# Patient Record
Sex: FEMALE | Race: WHITE | NOT HISPANIC OR LATINO
[De-identification: names, ages, dates, MRNs, and addresses within clinical notes are randomized per-mention and may not be internally consistent; named-entity substitution may affect disease eponyms.]

---

## 2017-06-15 ENCOUNTER — ASOB RESULT (OUTPATIENT)
Age: 27
End: 2017-06-15

## 2017-06-15 ENCOUNTER — APPOINTMENT (OUTPATIENT)
Dept: ANTEPARTUM | Facility: CLINIC | Age: 27
End: 2017-06-15

## 2017-08-03 ENCOUNTER — APPOINTMENT (OUTPATIENT)
Dept: ANTEPARTUM | Facility: CLINIC | Age: 27
End: 2017-08-03
Payer: COMMERCIAL

## 2017-08-03 ENCOUNTER — ASOB RESULT (OUTPATIENT)
Age: 27
End: 2017-08-03

## 2017-08-03 PROCEDURE — 76811 OB US DETAILED SNGL FETUS: CPT

## 2017-12-20 ENCOUNTER — INPATIENT (INPATIENT)
Facility: HOSPITAL | Age: 27
LOS: 2 days | Discharge: HOME | End: 2017-12-23
Attending: OBSTETRICS & GYNECOLOGY | Admitting: OBSTETRICS & GYNECOLOGY

## 2017-12-20 VITALS — WEIGHT: 205.03 LBS | HEIGHT: 66 IN

## 2017-12-20 DIAGNOSIS — Z3A.00 WEEKS OF GESTATION OF PREGNANCY NOT SPECIFIED: ICD-10-CM

## 2017-12-20 DIAGNOSIS — O26.90 PREGNANCY RELATED CONDITIONS, UNSPECIFIED, UNSPECIFIED TRIMESTER: ICD-10-CM

## 2017-12-20 LAB
BASOPHILS # BLD AUTO: 0.02 K/UL — SIGNIFICANT CHANGE UP (ref 0–0.2)
BASOPHILS NFR BLD AUTO: 0.2 % — SIGNIFICANT CHANGE UP (ref 0–2)
BLD GP AB SCN SERPL QL: NEGATIVE — SIGNIFICANT CHANGE UP
EOSINOPHIL # BLD AUTO: 0.05 K/UL — SIGNIFICANT CHANGE UP (ref 0–0.5)
EOSINOPHIL NFR BLD AUTO: 0.6 % — SIGNIFICANT CHANGE UP (ref 0–6)
HCT VFR BLD CALC: 35.6 % — SIGNIFICANT CHANGE UP (ref 34.5–45)
HGB BLD-MCNC: 11.7 G/DL — SIGNIFICANT CHANGE UP (ref 11.5–15.5)
IMM GRANULOCYTES # BLD AUTO: 0.04 # — SIGNIFICANT CHANGE UP
IMM GRANULOCYTES NFR BLD AUTO: 0.5 % — SIGNIFICANT CHANGE UP (ref 0–1.5)
LYMPHOCYTES # BLD AUTO: 1.81 K/UL — SIGNIFICANT CHANGE UP (ref 1–3.3)
LYMPHOCYTES # BLD AUTO: 20.6 % — SIGNIFICANT CHANGE UP (ref 13–44)
MCHC RBC-ENTMCNC: 28.2 PG — SIGNIFICANT CHANGE UP (ref 27–34)
MCHC RBC-ENTMCNC: 32.9 % — SIGNIFICANT CHANGE UP (ref 32–36)
MCV RBC AUTO: 85.8 FL — SIGNIFICANT CHANGE UP (ref 80–100)
MONOCYTES # BLD AUTO: 0.71 K/UL — SIGNIFICANT CHANGE UP (ref 0–0.9)
MONOCYTES NFR BLD AUTO: 8.1 % — SIGNIFICANT CHANGE UP (ref 2–14)
NEUTROPHILS # BLD AUTO: 6.17 K/UL — SIGNIFICANT CHANGE UP (ref 1.8–7.4)
NEUTROPHILS NFR BLD AUTO: 70 % — SIGNIFICANT CHANGE UP (ref 43–77)
NRBC # FLD: 0 — SIGNIFICANT CHANGE UP
PLATELET # BLD AUTO: 160 K/UL — SIGNIFICANT CHANGE UP (ref 150–400)
PMV BLD: 10.3 FL — SIGNIFICANT CHANGE UP (ref 7–13)
RBC # BLD: 4.15 M/UL — SIGNIFICANT CHANGE UP (ref 3.8–5.2)
RBC # FLD: 14.9 % — HIGH (ref 10.3–14.5)
RH IG SCN BLD-IMP: POSITIVE — SIGNIFICANT CHANGE UP
T PALLIDUM AB TITR SER: NEGATIVE — SIGNIFICANT CHANGE UP
WBC # BLD: 8.8 K/UL — SIGNIFICANT CHANGE UP (ref 3.8–10.5)
WBC # FLD AUTO: 8.8 K/UL — SIGNIFICANT CHANGE UP (ref 3.8–10.5)

## 2017-12-20 RX ORDER — OXYTOCIN 10 UNIT/ML
333.33 VIAL (ML) INJECTION
Qty: 20 | Refills: 0 | Status: DISCONTINUED | OUTPATIENT
Start: 2017-12-20 | End: 2017-12-20

## 2017-12-20 RX ORDER — AMPICILLIN TRIHYDRATE 250 MG
CAPSULE ORAL
Qty: 0 | Refills: 0 | Status: DISCONTINUED | OUTPATIENT
Start: 2017-12-20 | End: 2017-12-21

## 2017-12-20 RX ORDER — AMPICILLIN TRIHYDRATE 250 MG
2 CAPSULE ORAL ONCE
Qty: 0 | Refills: 0 | Status: COMPLETED | OUTPATIENT
Start: 2017-12-20 | End: 2017-12-20

## 2017-12-20 RX ORDER — SODIUM CHLORIDE 9 MG/ML
1000 INJECTION, SOLUTION INTRAVENOUS ONCE
Qty: 0 | Refills: 0 | Status: DISCONTINUED | OUTPATIENT
Start: 2017-12-20 | End: 2017-12-20

## 2017-12-20 RX ORDER — AMPICILLIN TRIHYDRATE 250 MG
1 CAPSULE ORAL EVERY 4 HOURS
Qty: 0 | Refills: 0 | Status: DISCONTINUED | OUTPATIENT
Start: 2017-12-20 | End: 2017-12-21

## 2017-12-20 RX ORDER — SODIUM CHLORIDE 9 MG/ML
1000 INJECTION, SOLUTION INTRAVENOUS
Qty: 0 | Refills: 0 | Status: DISCONTINUED | OUTPATIENT
Start: 2017-12-20 | End: 2017-12-20

## 2017-12-20 RX ADMIN — Medication 208 GRAM(S): at 13:37

## 2017-12-20 RX ADMIN — Medication 208 GRAM(S): at 22:05

## 2017-12-20 RX ADMIN — Medication 208 GRAM(S): at 17:56

## 2017-12-20 RX ADMIN — Medication 216 GRAM(S): at 09:03

## 2017-12-21 ENCOUNTER — TRANSCRIPTION ENCOUNTER (OUTPATIENT)
Age: 27
End: 2017-12-21

## 2017-12-21 RX ORDER — OXYTOCIN 10 UNIT/ML
333.33 VIAL (ML) INJECTION
Qty: 20 | Refills: 0 | Status: COMPLETED | OUTPATIENT
Start: 2017-12-21 | End: 2017-12-21

## 2017-12-21 RX ORDER — DOCUSATE SODIUM 100 MG
100 CAPSULE ORAL THREE TIMES A DAY
Qty: 0 | Refills: 0 | Status: DISCONTINUED | OUTPATIENT
Start: 2017-12-21 | End: 2017-12-23

## 2017-12-21 RX ORDER — SODIUM CHLORIDE 9 MG/ML
1000 INJECTION, SOLUTION INTRAVENOUS
Qty: 0 | Refills: 0 | Status: DISCONTINUED | OUTPATIENT
Start: 2017-12-21 | End: 2017-12-21

## 2017-12-21 RX ORDER — SIMETHICONE 80 MG/1
80 TABLET, CHEWABLE ORAL EVERY 6 HOURS
Qty: 0 | Refills: 0 | Status: DISCONTINUED | OUTPATIENT
Start: 2017-12-21 | End: 2017-12-23

## 2017-12-21 RX ORDER — IBUPROFEN 200 MG
1 TABLET ORAL
Qty: 0 | Refills: 0 | DISCHARGE
Start: 2017-12-21

## 2017-12-21 RX ORDER — MAGNESIUM HYDROXIDE 400 MG/1
30 TABLET, CHEWABLE ORAL
Qty: 0 | Refills: 0 | Status: DISCONTINUED | OUTPATIENT
Start: 2017-12-21 | End: 2017-12-23

## 2017-12-21 RX ORDER — MAGNESIUM HYDROXIDE 400 MG/1
30 TABLET, CHEWABLE ORAL DAILY
Qty: 0 | Refills: 0 | Status: DISCONTINUED | OUTPATIENT
Start: 2017-12-21 | End: 2017-12-23

## 2017-12-21 RX ORDER — OXYCODONE HYDROCHLORIDE 5 MG/1
5 TABLET ORAL
Qty: 0 | Refills: 0 | Status: DISCONTINUED | OUTPATIENT
Start: 2017-12-21 | End: 2017-12-23

## 2017-12-21 RX ORDER — HYDROCORTISONE 1 %
1 OINTMENT (GRAM) TOPICAL EVERY 4 HOURS
Qty: 0 | Refills: 0 | Status: DISCONTINUED | OUTPATIENT
Start: 2017-12-21 | End: 2017-12-22

## 2017-12-21 RX ORDER — SODIUM CHLORIDE 9 MG/ML
1000 INJECTION, SOLUTION INTRAVENOUS ONCE
Qty: 0 | Refills: 0 | Status: DISCONTINUED | OUTPATIENT
Start: 2017-12-21 | End: 2017-12-21

## 2017-12-21 RX ORDER — IBUPROFEN 200 MG
600 TABLET ORAL EVERY 6 HOURS
Qty: 0 | Refills: 0 | Status: COMPLETED | OUTPATIENT
Start: 2017-12-21 | End: 2018-11-19

## 2017-12-21 RX ORDER — KETOROLAC TROMETHAMINE 30 MG/ML
30 SYRINGE (ML) INJECTION ONCE
Qty: 0 | Refills: 0 | Status: DISCONTINUED | OUTPATIENT
Start: 2017-12-21 | End: 2017-12-21

## 2017-12-21 RX ORDER — DIBUCAINE 1 %
1 OINTMENT (GRAM) RECTAL EVERY 4 HOURS
Qty: 0 | Refills: 0 | Status: DISCONTINUED | OUTPATIENT
Start: 2017-12-21 | End: 2017-12-21

## 2017-12-21 RX ORDER — OXYTOCIN 10 UNIT/ML
41.67 VIAL (ML) INJECTION
Qty: 20 | Refills: 0 | Status: DISCONTINUED | OUTPATIENT
Start: 2017-12-21 | End: 2017-12-21

## 2017-12-21 RX ORDER — OXYCODONE HYDROCHLORIDE 5 MG/1
5 TABLET ORAL EVERY 4 HOURS
Qty: 0 | Refills: 0 | Status: DISCONTINUED | OUTPATIENT
Start: 2017-12-21 | End: 2017-12-23

## 2017-12-21 RX ORDER — AER TRAVELER 0.5 G/1
1 SOLUTION RECTAL; TOPICAL EVERY 4 HOURS
Qty: 0 | Refills: 0 | Status: DISCONTINUED | OUTPATIENT
Start: 2017-12-21 | End: 2017-12-21

## 2017-12-21 RX ORDER — PRAMOXINE HYDROCHLORIDE 150 MG/15G
1 AEROSOL, FOAM RECTAL EVERY 4 HOURS
Qty: 0 | Refills: 0 | Status: DISCONTINUED | OUTPATIENT
Start: 2017-12-21 | End: 2017-12-22

## 2017-12-21 RX ORDER — DOCUSATE SODIUM 100 MG
100 CAPSULE ORAL
Qty: 0 | Refills: 0 | Status: DISCONTINUED | OUTPATIENT
Start: 2017-12-21 | End: 2017-12-23

## 2017-12-21 RX ORDER — OXYTOCIN 10 UNIT/ML
333.33 VIAL (ML) INJECTION
Qty: 20 | Refills: 0 | Status: COMPLETED | OUTPATIENT
Start: 2017-12-21

## 2017-12-21 RX ORDER — GLYCERIN ADULT
1 SUPPOSITORY, RECTAL RECTAL AT BEDTIME
Qty: 0 | Refills: 0 | Status: DISCONTINUED | OUTPATIENT
Start: 2017-12-21 | End: 2017-12-23

## 2017-12-21 RX ORDER — OXYTOCIN 10 UNIT/ML
2 VIAL (ML) INJECTION
Qty: 30 | Refills: 0 | Status: DISCONTINUED | OUTPATIENT
Start: 2017-12-21 | End: 2017-12-21

## 2017-12-21 RX ORDER — AER TRAVELER 0.5 G/1
1 SOLUTION RECTAL; TOPICAL EVERY 4 HOURS
Qty: 0 | Refills: 0 | Status: DISCONTINUED | OUTPATIENT
Start: 2017-12-21 | End: 2017-12-23

## 2017-12-21 RX ORDER — PRAMOXINE HYDROCHLORIDE 150 MG/15G
1 AEROSOL, FOAM RECTAL EVERY 4 HOURS
Qty: 0 | Refills: 0 | Status: DISCONTINUED | OUTPATIENT
Start: 2017-12-21 | End: 2017-12-21

## 2017-12-21 RX ORDER — LANOLIN
1 OINTMENT (GRAM) TOPICAL EVERY 6 HOURS
Qty: 0 | Refills: 0 | Status: DISCONTINUED | OUTPATIENT
Start: 2017-12-21 | End: 2017-12-23

## 2017-12-21 RX ORDER — HYDROCORTISONE 1 %
1 OINTMENT (GRAM) TOPICAL EVERY 4 HOURS
Qty: 0 | Refills: 0 | Status: DISCONTINUED | OUTPATIENT
Start: 2017-12-21 | End: 2017-12-21

## 2017-12-21 RX ORDER — DIPHENHYDRAMINE HCL 50 MG
25 CAPSULE ORAL EVERY 6 HOURS
Qty: 0 | Refills: 0 | Status: DISCONTINUED | OUTPATIENT
Start: 2017-12-21 | End: 2017-12-23

## 2017-12-21 RX ORDER — IBUPROFEN 200 MG
600 TABLET ORAL EVERY 6 HOURS
Qty: 0 | Refills: 0 | Status: DISCONTINUED | OUTPATIENT
Start: 2017-12-21 | End: 2017-12-23

## 2017-12-21 RX ORDER — DIBUCAINE 1 %
1 OINTMENT (GRAM) RECTAL EVERY 4 HOURS
Qty: 0 | Refills: 0 | Status: DISCONTINUED | OUTPATIENT
Start: 2017-12-21 | End: 2017-12-23

## 2017-12-21 RX ORDER — TETANUS TOXOID, REDUCED DIPHTHERIA TOXOID AND ACELLULAR PERTUSSIS VACCINE, ADSORBED 5; 2.5; 8; 8; 2.5 [IU]/.5ML; [IU]/.5ML; UG/.5ML; UG/.5ML; UG/.5ML
0.5 SUSPENSION INTRAMUSCULAR ONCE
Qty: 0 | Refills: 0 | Status: DISCONTINUED | OUTPATIENT
Start: 2017-12-21 | End: 2017-12-23

## 2017-12-21 RX ORDER — FERROUS SULFATE 325(65) MG
325 TABLET ORAL
Qty: 0 | Refills: 0 | Status: DISCONTINUED | OUTPATIENT
Start: 2017-12-21 | End: 2017-12-23

## 2017-12-21 RX ORDER — ASCORBIC ACID 60 MG
250 TABLET,CHEWABLE ORAL
Qty: 0 | Refills: 0 | Status: DISCONTINUED | OUTPATIENT
Start: 2017-12-21 | End: 2017-12-23

## 2017-12-21 RX ORDER — SODIUM CHLORIDE 9 MG/ML
3 INJECTION INTRAMUSCULAR; INTRAVENOUS; SUBCUTANEOUS EVERY 8 HOURS
Qty: 0 | Refills: 0 | Status: DISCONTINUED | OUTPATIENT
Start: 2017-12-21 | End: 2017-12-21

## 2017-12-21 RX ORDER — SODIUM CHLORIDE 9 MG/ML
3 INJECTION INTRAMUSCULAR; INTRAVENOUS; SUBCUTANEOUS EVERY 8 HOURS
Qty: 0 | Refills: 0 | Status: DISCONTINUED | OUTPATIENT
Start: 2017-12-21 | End: 2017-12-23

## 2017-12-21 RX ADMIN — Medication 208 GRAM(S): at 02:00

## 2017-12-21 RX ADMIN — Medication 1000 MILLIUNIT(S)/MIN: at 10:36

## 2017-12-21 RX ADMIN — Medication 30 MILLIGRAM(S): at 10:37

## 2017-12-21 RX ADMIN — Medication 30 MILLIGRAM(S): at 11:11

## 2017-12-21 RX ADMIN — Medication 125 MILLIUNIT(S)/MIN: at 10:37

## 2017-12-21 RX ADMIN — Medication 600 MILLIGRAM(S): at 22:35

## 2017-12-21 RX ADMIN — Medication 2 MILLIUNIT(S)/MIN: at 02:38

## 2017-12-21 RX ADMIN — Medication 325 MILLIGRAM(S): at 17:53

## 2017-12-21 RX ADMIN — Medication 0.2 MILLIGRAM(S): at 13:24

## 2017-12-21 RX ADMIN — Medication 0.2 MILLIGRAM(S): at 22:04

## 2017-12-21 RX ADMIN — Medication 208 GRAM(S): at 06:03

## 2017-12-21 RX ADMIN — Medication 600 MILLIGRAM(S): at 22:04

## 2017-12-21 RX ADMIN — Medication 0.2 MILLIGRAM(S): at 17:21

## 2017-12-21 RX ADMIN — SODIUM CHLORIDE 3 MILLILITER(S): 9 INJECTION INTRAMUSCULAR; INTRAVENOUS; SUBCUTANEOUS at 22:35

## 2017-12-21 NOTE — DISCHARGE NOTE OB - PLAN OF CARE
postpartum recovery Call Dr Ellis if any issues with bleeding pain fever shortness of breath leg swelling

## 2017-12-21 NOTE — DISCHARGE NOTE OB - MEDICATION SUMMARY - MEDICATIONS TO TAKE
I will START or STAY ON the medications listed below when I get home from the hospital:    ibuprofen 600 mg oral tablet  -- 1 tab(s) by mouth every 6 hours  -- Indication: For Pain    Prenatal 1 Plus 1 oral tablet  -- 1 tab(s) by mouth once a day  -- Indication: For Postpartum

## 2017-12-21 NOTE — PROVIDER CONTACT NOTE (OTHER) - ASSESSMENT
On admission assessment @ 1200, pt peripads saturated x 2 pads with blue chux saturatedand flat golf ball size clot noted.. Fundus firm and at umbilicus, 2nd degree laceration well approximated. Pt. peripads changed and monitored x 1 hour. At 1300 fundus firm and at umbilicus, pt. states felt ghush of blood upon palpation and noted to have approximately 1 peripad saturated.

## 2017-12-21 NOTE — PROVIDER CONTACT NOTE (OTHER) - ACTION/TREATMENT ORDERED:
Dr. Gamino PGY1 to bedside to assess pt. Administered Cytotec per rectally and began methergine series. Continue to monitor pt and notify MD of any further changes.

## 2017-12-21 NOTE — DISCHARGE NOTE OB - CARE PLAN
Principal Discharge DX:	Delivery normal  Goal:	postpartum recovery  Instructions for follow-up, activity and diet:	Call Dr Ellis if any issues with bleeding pain fever shortness of breath leg swelling

## 2017-12-21 NOTE — DISCHARGE NOTE OB - MATERIALS PROVIDED
Birth Certificate Instructions/Vaccinations/Bottle Feeding Log/Tdap Vaccination (VIS Pub Date: 2012)/  Immunization Record/Breastfeeding Log/Guide to Postpartum Care/[facility] [place] Screening Program/[facility] Hearing Screen Program/Shaken Baby Prevention Handout

## 2017-12-21 NOTE — DISCHARGE NOTE OB - CARE PROVIDER_API CALL
Yumiko Cerrato), Obstetrics and Gynecology  [address] [place] [address]  Phone: [phone]  Fax: [phone]

## 2017-12-21 NOTE — DISCHARGE NOTE OB - HOSPITAL COURSE
26 yo  at 39.5wks admitted with PROM - Cytotec and pitocin induction    of a viable female infant   2nd degree perineal laceration   normal postpartum

## 2017-12-21 NOTE — CHART NOTE - NSCHARTNOTEFT_GEN_A_CORE
S: Pt examined at bedside for PPH of 200cc.    Pt feeling well, no lightheadedness, dizziness, SOB, CP.  Tolerating PO, ambulating, voiding well, no nausea/vomiting.    O: /59 HR 98 O2Sa 98%@RA  Abd: soft, non tender, non distended, uterus firm and at level below umbilicus  : no active vaginal bleeding, some dark red clots expressed, lochia wnl    A/P: Pt immediately postpartum s/p , w hx of retained placenta in prior pregnancy  - cytotec 1000 MS  - methergine series started  - continue to monitor    D/w Dr. Caleb Gamino, PGY1    Addendem:   Pt reevaluated at bedside, per nurse a large clot estimated at 100cc passed while urinating.    Pt still stable, clinical status unchanged.  No complaints.    O: /58  O2Sa95%@RA  Abd: soft, non tender, non distended w uterus still firm at below umbilicus  : spec exam revealed no brisk bright red bleeding, minimal oozing, cervix not visualized due to pt discomfort w exam, vaginal walls intact and sutures in place in perineum; no clots in vaginal vault; lochia is normal    Bedside ultrasound: uterine stripe visualized, no retained products or clots in uterus, bladder is full    A/P: Pt is stable to recover on post partum floors  - continue w methergine series  - continue to monitor vitals  - CBC in AM?    Will discuss w Dr. Caleb Gamino, PGY1 S: Pt examined at bedside for PPH of 200cc.    Pt feeling well, no lightheadedness, dizziness, SOB, CP.  Tolerating PO, ambulating, voiding well, no nausea/vomiting.    O: /59 HR 98 O2Sa 98%@RA  Abd: soft, non tender, non distended, uterus firm and at level below umbilicus  : no active vaginal bleeding, some dark red clots expressed, lochia wnl    A/P: Pt immediately postpartum s/p , w hx of retained placenta in prior pregnancy  - cytotec 1000 SC  - methergine series started  - continue to monitor    D/w Dr. Caleb Gamino, PGY1    Addendem:   Pt reevaluated at bedside, per nurse a large clot estimated at 100cc passed while urinating.    Pt still stable, clinical status unchanged.  No complaints.    O: /58  O2Sa95%@RA  Abd: soft, non tender, non distended w uterus still firm at below umbilicus  : spec exam revealed no brisk bright red bleeding, minimal oozing, cervix not visualized due to pt discomfort w exam, vaginal walls intact and sutures in place in perineum; no clots in vaginal vault; lochia is normal    Bedside ultrasound: uterine stripe visualized, no retained products or clots in uterus, bladder is full    A/P: Pt is stable to recover on post partum floors  - continue w methergine series  - continue to monitor vitals  - CBC in AM    D/w Dr. Caleb Gamino, PGY1

## 2017-12-21 NOTE — DISCHARGE NOTE OB - PATIENT PORTAL LINK FT
“You can access the [facility] Patient Portal, offered by [facility], by registering with the following website: [email or web address]”

## 2017-12-22 LAB
HCT VFR BLD CALC: 29.2 % — LOW (ref 34.5–45)
HGB BLD-MCNC: 9.4 G/DL — LOW (ref 11.5–15.5)
MCHC RBC-ENTMCNC: 27.9 PG — SIGNIFICANT CHANGE UP (ref 27–34)
MCHC RBC-ENTMCNC: 32.2 % — SIGNIFICANT CHANGE UP (ref 32–36)
MCV RBC AUTO: 86.6 FL — SIGNIFICANT CHANGE UP (ref 80–100)
NRBC # FLD: 0 — SIGNIFICANT CHANGE UP
PLATELET # BLD AUTO: 140 K/UL — LOW (ref 150–400)
PMV BLD: 10.3 FL — SIGNIFICANT CHANGE UP (ref 7–13)
RBC # BLD: 3.37 M/UL — LOW (ref 3.8–5.2)
RBC # FLD: 15.4 % — HIGH (ref 10.3–14.5)
WBC # BLD: 10.38 K/UL — SIGNIFICANT CHANGE UP (ref 3.8–10.5)
WBC # FLD AUTO: 10.38 K/UL — SIGNIFICANT CHANGE UP (ref 3.8–10.5)

## 2017-12-22 RX ORDER — PRAMOXINE HYDROCHLORIDE 150 MG/15G
1 AEROSOL, FOAM RECTAL EVERY 4 HOURS
Qty: 0 | Refills: 0 | Status: DISCONTINUED | OUTPATIENT
Start: 2017-12-22 | End: 2017-12-23

## 2017-12-22 RX ORDER — HYDROCORTISONE 1 %
1 OINTMENT (GRAM) TOPICAL EVERY 4 HOURS
Qty: 0 | Refills: 0 | Status: DISCONTINUED | OUTPATIENT
Start: 2017-12-22 | End: 2017-12-23

## 2017-12-22 RX ADMIN — Medication 600 MILLIGRAM(S): at 23:19

## 2017-12-22 RX ADMIN — Medication 600 MILLIGRAM(S): at 23:46

## 2017-12-22 RX ADMIN — Medication 600 MILLIGRAM(S): at 11:27

## 2017-12-22 RX ADMIN — Medication 0.2 MILLIGRAM(S): at 11:29

## 2017-12-22 RX ADMIN — Medication 600 MILLIGRAM(S): at 12:10

## 2017-12-22 RX ADMIN — Medication 600 MILLIGRAM(S): at 06:08

## 2017-12-22 RX ADMIN — Medication 0.2 MILLIGRAM(S): at 05:38

## 2017-12-22 RX ADMIN — Medication 250 MILLIGRAM(S): at 07:00

## 2017-12-22 RX ADMIN — SODIUM CHLORIDE 3 MILLILITER(S): 9 INJECTION INTRAMUSCULAR; INTRAVENOUS; SUBCUTANEOUS at 06:30

## 2017-12-22 RX ADMIN — Medication 100 MILLIGRAM(S): at 05:38

## 2017-12-22 RX ADMIN — Medication 100 MILLIGRAM(S): at 23:18

## 2017-12-22 RX ADMIN — Medication 600 MILLIGRAM(S): at 05:38

## 2017-12-22 RX ADMIN — Medication 0.2 MILLIGRAM(S): at 02:00

## 2017-12-22 NOTE — LACTATION INITIAL EVALUATION - LACTATION INTERVENTIONS
initiate hand expression routine/initiate dual electric pump routine/mother taught hand expression and not much colostrum noted/initiate skin to skin
initiate skin to skin/mother educated to breast pump after each feeding for stimulation/initiate dual electric pump routine/initiate hand expression routine

## 2017-12-22 NOTE — LACTATION INITIAL EVALUATION - POTENTIAL FOR
ineffective breastfeeding/knowledge deficit/sore nipples
knowledge deficit/ineffective breastfeeding

## 2017-12-22 NOTE — PROGRESS NOTE ADULT - PROBLEM SELECTOR PLAN 1
- encourage more out of bed and ambulation  - analgesia PRN  - regular diet  - am CBC drawn, f/u results    KELLY Gamino PGY1

## 2017-12-22 NOTE — LACTATION INITIAL EVALUATION - LATCH: LATCH INFANT
repeated attempts, holds nipple in mouth, stimulate to suck
repeated attempts, holds nipple in mouth, stimulate to suck

## 2017-12-22 NOTE — PROGRESS NOTE ADULT - ATTENDING COMMENTS
Patient seen and agree with above  VSS  minimal lochia  ext - c/c/e nt    IMP  PPD #1  post   PPH, stable  PLAN  continue pp care  discharge   ARIEL Burgos

## 2017-12-22 NOTE — PROGRESS NOTE ADULT - SUBJECTIVE AND OBJECTIVE BOX
Pt seen at bedside for post-epidural check. No parasthesias, numbness, or weakness of LE's. Bowel/bladder function intact. No complaints at this time

## 2017-12-22 NOTE — PROGRESS NOTE ADULT - SUBJECTIVE AND OBJECTIVE BOX
27y old w PMHX of retained placenta, post partum day 1, +300cc    Patient seen and examined at bedside, no acute overnight events. No acute complaints, pain well controlled.  Patient is ambulating, voiding spontaneously, passing gas, and tolerating regular diet.     Denies dizziess, lightheadedness, SOB.  States vaginal bleeding has stopped, and she has stopped passing clots since getting up to the floors.    Vital Signs Last 24 Hours  T(C): 36.6 (12-22-17 @ 06:02), Max: 37.4 (12-21-17 @ 09:33)  HR: 79 (12-22-17 @ 06:02) (78 - 102)  BP: 98/63 (12-22-17 @ 06:02) (98/63 - 112/68)  RR: 18 (12-22-17 @ 06:02) (16 - 18)  SpO2: 99% (12-22-17 @ 06:02) (95% - 100%)    Physical Exam:  General: NAD  Abdomen: Soft, non-tender, non-distended, fundus firm  Pelvic: Lochia wnl, minimal  Ext: NTBL  Labs:  Blood type: A Positive  Rubella IgG: RPR: Negative                          11.7   8.80 >-----------< 160    ( 12-20 @ 09:01 )             35.6                  MEDICATIONS  (STANDING):  ascorbic acid 250 milliGRAM(s) Oral two times a day  diphtheria/tetanus/pertussis (acellular) Vaccine (ADAcel) 0.5 milliLiter(s) IntraMuscular once  docusate sodium 100 milliGRAM(s) Oral three times a day  ferrous    sulfate 325 milliGRAM(s) Oral three times a day with meals  ibuprofen  Tablet 600 milliGRAM(s) Oral every 6 hours  methylergonovine 0.2 milliGRAM(s) Oral every 4 hours  oxyCODONE    IR 5 milliGRAM(s) Oral every 3 hours  prenatal multivitamin 1 Tablet(s) Oral daily  sodium chloride 0.9% lock flush 3 milliLiter(s) IV Push every 8 hours    MEDICATIONS  (PRN):  dibucaine 1% Ointment 1 Application(s) Topical every 4 hours PRN Perineal Discomfort  diphenhydrAMINE   Capsule 25 milliGRAM(s) Oral every 6 hours PRN Itching  docusate sodium 100 milliGRAM(s) Oral two times a day PRN Stool Softening  glycerin Suppository - Adult 1 Suppository(s) Rectal at bedtime PRN Constipation  hydrocortisone 1% Cream 1 Application(s) Topical every 4 hours PRN perineal discomfort  lanolin Ointment 1 Application(s) Topical every 6 hours PRN Sore Nipples  magnesium hydroxide Suspension 30 milliLiter(s) Oral two times a day PRN Constipation  magnesium hydroxide Suspension 30 milliLiter(s) Oral daily PRN Constipation  oxyCODONE    IR 5 milliGRAM(s) Oral every 4 hours PRN Severe Pain (7 -10)  pramoxine 1%/zinc 5% Cream 1 Application(s) Topical every 4 hours PRN perineal discomfort  simethicone 80 milliGRAM(s) Chew every 6 hours PRN Gas  witch hazel Pads 1 Application(s) Topical every 4 hours PRN Perineal Discomfort

## 2017-12-22 NOTE — LACTATION INITIAL EVALUATION - INTERVENTION OUTCOME
verbalizes understanding/good return demonstration/Worked with mother to latch  and  latches for 5 minutes and is disorganized and mother to triple feed, discussed to breastfeed 8-12 times in 24 hours and then breast pump every 2-3 hours and give colostrum and taught alternative methods and then formula and discussed breastfeeding log and referral list to lactation  consultants provided , Rn aware of plan/demonstrates understanding of teaching
demonstrates understanding of teaching/Worked with mother in positioning and latch and  trying to latch and not 24 hours , mother with widely spaced breasts and denies history of PCOS , mother shown hand expression and colostrum noted and mother educated on feeding cues and to breastfeed 8-12 times in 24 hours and discussed breastfeeding log and assist prn, Rn aware of plan/good return demonstration/verbalizes understanding

## 2017-12-22 NOTE — LACTATION INITIAL EVALUATION - NS LACT CON REASON FOR REQ
mother with widely spaced breasts and noted to have low supply first child/multiparous mom
mother with history of low supply/multiparous mom

## 2017-12-23 VITALS
SYSTOLIC BLOOD PRESSURE: 105 MMHG | OXYGEN SATURATION: 100 % | HEART RATE: 80 BPM | RESPIRATION RATE: 20 BRPM | DIASTOLIC BLOOD PRESSURE: 57 MMHG

## 2017-12-23 RX ADMIN — Medication 325 MILLIGRAM(S): at 09:28

## 2017-12-23 RX ADMIN — Medication 1 TABLET(S): at 09:28

## 2017-12-23 RX ADMIN — Medication 600 MILLIGRAM(S): at 05:49

## 2017-12-23 RX ADMIN — Medication 600 MILLIGRAM(S): at 06:17

## 2017-12-23 RX ADMIN — Medication 100 MILLIGRAM(S): at 05:50

## 2017-12-23 NOTE — PROGRESS NOTE ADULT - PROBLEM SELECTOR PLAN 1
- continue with po analgesia  - increase ambulation  - SCDs when in bed  - encourage incentive spirometry  - continue regular diet  - IV lock  - no labs    Marie Cisneros MD PGY1

## 2017-12-23 NOTE — PROGRESS NOTE ADULT - SUBJECTIVE AND OBJECTIVE BOX
Patient seen and examined at bedside, no acute overnight events. No acute complaints, pain well controlled. Patient is ambulating, voiding spontaneously, passing gas, and tolerating regular diet.    Vital Signs Last 24 Hours  T(C): 36.6 (12-22-17 @ 17:56), Max: 36.7 (12-22-17 @ 09:58)  HR: 70 (12-22-17 @ 17:56) (70 - 79)  BP: 100/63 (12-22-17 @ 17:56) (98/63 - 103/59)  RR: 17 (12-22-17 @ 17:56) (17 - 18)  SpO2: 98% (12-22-17 @ 17:56) (98% - 99%)    Physical Exam:  General: NAD  Abdomen: soft, non-tender, non-distended, fundus firm  Pelvic: lochia wnl    Labs:  Blood Type: A Positive  Antibody Screen: Negative  RPR: Negative               9.4    10.38 )-----------( 140      ( 12-22 @ 06:30 )             29.2                11.7   8.80  )-----------( 160      ( 12-20 @ 09:01 )             35.6         MEDICATIONS  (STANDING):  ascorbic acid 250 milliGRAM(s) Oral two times a day  diphtheria/tetanus/pertussis (acellular) Vaccine (ADAcel) 0.5 milliLiter(s) IntraMuscular once  docusate sodium 100 milliGRAM(s) Oral three times a day  ferrous    sulfate 325 milliGRAM(s) Oral three times a day with meals  ibuprofen  Tablet 600 milliGRAM(s) Oral every 6 hours  oxyCODONE    IR 5 milliGRAM(s) Oral every 3 hours  prenatal multivitamin 1 Tablet(s) Oral daily  sodium chloride 0.9% lock flush 3 milliLiter(s) IV Push every 8 hours    MEDICATIONS  (PRN):  dibucaine 1% Ointment 1 Application(s) Topical every 4 hours PRN Perineal Discomfort  diphenhydrAMINE   Capsule 25 milliGRAM(s) Oral every 6 hours PRN Itching  docusate sodium 100 milliGRAM(s) Oral two times a day PRN Stool Softening  glycerin Suppository - Adult 1 Suppository(s) Rectal at bedtime PRN Constipation  hydrocortisone 1% Cream 1 Application(s) Topical every 4 hours PRN perineal discomfort  lanolin Ointment 1 Application(s) Topical every 6 hours PRN Sore Nipples  magnesium hydroxide Suspension 30 milliLiter(s) Oral two times a day PRN Constipation  magnesium hydroxide Suspension 30 milliLiter(s) Oral daily PRN Constipation  oxyCODONE    IR 5 milliGRAM(s) Oral every 4 hours PRN Severe Pain (7 -10)  pramoxine 1%/zinc 5% Cream 1 Application(s) Topical every 4 hours PRN perineal discomfort  simethicone 80 milliGRAM(s) Chew every 6 hours PRN Gas  witch hazel Pads 1 Application(s) Topical every 4 hours PRN Perineal Discomfort

## 2019-03-25 ENCOUNTER — RESULT REVIEW (OUTPATIENT)
Age: 29
End: 2019-03-25

## 2019-04-16 ENCOUNTER — ASOB RESULT (OUTPATIENT)
Age: 29
End: 2019-04-16

## 2019-04-16 ENCOUNTER — APPOINTMENT (OUTPATIENT)
Dept: ANTEPARTUM | Facility: CLINIC | Age: 29
End: 2019-04-16
Payer: COMMERCIAL

## 2019-04-16 PROCEDURE — 76801 OB US < 14 WKS SINGLE FETUS: CPT | Mod: 59

## 2019-04-16 PROCEDURE — 36416 COLLJ CAPILLARY BLOOD SPEC: CPT

## 2019-04-16 PROCEDURE — 76813 OB US NUCHAL MEAS 1 GEST: CPT

## 2019-06-18 ENCOUNTER — OUTPATIENT (OUTPATIENT)
Dept: OUTPATIENT SERVICES | Facility: HOSPITAL | Age: 29
LOS: 1 days | Discharge: HOME | End: 2019-06-18

## 2019-06-18 DIAGNOSIS — D64.9 ANEMIA, UNSPECIFIED: ICD-10-CM

## 2019-06-19 ENCOUNTER — ASOB RESULT (OUTPATIENT)
Age: 29
End: 2019-06-19

## 2019-06-19 ENCOUNTER — APPOINTMENT (OUTPATIENT)
Dept: ANTEPARTUM | Facility: CLINIC | Age: 29
End: 2019-06-19
Payer: COMMERCIAL

## 2019-06-19 PROCEDURE — 76811 OB US DETAILED SNGL FETUS: CPT

## 2019-08-02 ENCOUNTER — OUTPATIENT (OUTPATIENT)
Dept: OUTPATIENT SERVICES | Facility: HOSPITAL | Age: 29
LOS: 1 days | Discharge: HOME | End: 2019-08-02

## 2019-08-02 DIAGNOSIS — Z36.89 ENCOUNTER FOR OTHER SPECIFIED ANTENATAL SCREENING: ICD-10-CM

## 2019-09-06 ENCOUNTER — OUTPATIENT (OUTPATIENT)
Dept: OUTPATIENT SERVICES | Facility: HOSPITAL | Age: 29
LOS: 1 days | Discharge: HOME | End: 2019-09-06

## 2019-09-06 DIAGNOSIS — R42 DIZZINESS AND GIDDINESS: ICD-10-CM

## 2019-09-09 ENCOUNTER — OUTPATIENT (OUTPATIENT)
Dept: OUTPATIENT SERVICES | Facility: HOSPITAL | Age: 29
LOS: 1 days | Discharge: HOME | End: 2019-09-09
Payer: COMMERCIAL

## 2019-09-09 VITALS
TEMPERATURE: 98 F | RESPIRATION RATE: 20 BRPM | HEART RATE: 79 BPM | SYSTOLIC BLOOD PRESSURE: 107 MMHG | DIASTOLIC BLOOD PRESSURE: 57 MMHG

## 2019-09-09 VITALS — SYSTOLIC BLOOD PRESSURE: 99 MMHG | HEART RATE: 83 BPM | DIASTOLIC BLOOD PRESSURE: 55 MMHG

## 2019-09-09 DIAGNOSIS — O26.899 OTHER SPECIFIED PREGNANCY RELATED CONDITIONS, UNSPECIFIED TRIMESTER: ICD-10-CM

## 2019-09-09 DIAGNOSIS — Z3A.00 WEEKS OF GESTATION OF PREGNANCY NOT SPECIFIED: ICD-10-CM

## 2019-09-09 PROCEDURE — 59025 FETAL NON-STRESS TEST: CPT | Mod: 26

## 2019-09-09 NOTE — OB PROVIDER TRIAGE NOTE - NS_OBGYNHISTORY_OBGYN_ALL_OB_FT
8/24/2016 FTSVD 7lb 6oz retained placenta, no D&C  12/17/17 FTSVD 7lb 10z    Denies current AP complications

## 2019-09-09 NOTE — OB PROVIDER TRIAGE NOTE - NSOBPROVIDERNOTE_OBGYN_ALL_OB_FT
29 y.o.  @ 32.5 weeks c/o decreased fetal movement since 7am. Denies vb, lof, contractions.     Denies significant med/surg/gyn history  Ob -  2016 FTSVD 7lb 6oz retained placenta, no D&C  17 FTSVD 7lb 10z    Denies current AP complications  NKDA  meds - PNV    abdomen soft and nontender  VSS  TAS - breech, anterior placenta, brandy 12cm, bpp     d/w Dr. Dubon  pt discharged - reassuring fetal status  f/u with next scheduled appointment  daily kick counts reviewed  ptl precautions reviewed

## 2019-09-15 ENCOUNTER — OUTPATIENT (OUTPATIENT)
Dept: INPATIENT UNIT | Facility: HOSPITAL | Age: 29
LOS: 1 days | Discharge: HOME | End: 2019-09-15
Payer: COMMERCIAL

## 2019-09-15 VITALS — SYSTOLIC BLOOD PRESSURE: 106 MMHG | DIASTOLIC BLOOD PRESSURE: 61 MMHG | HEART RATE: 89 BPM

## 2019-09-15 DIAGNOSIS — Z3A.00 WEEKS OF GESTATION OF PREGNANCY NOT SPECIFIED: ICD-10-CM

## 2019-09-15 DIAGNOSIS — O26.899 OTHER SPECIFIED PREGNANCY RELATED CONDITIONS, UNSPECIFIED TRIMESTER: ICD-10-CM

## 2019-09-15 LAB — AMNISURE ROM (RUPTURE OF MEMBRANES): NEGATIVE — SIGNIFICANT CHANGE UP

## 2019-09-15 PROCEDURE — 59025 FETAL NON-STRESS TEST: CPT | Mod: 26

## 2019-09-15 NOTE — OB PROVIDER TRIAGE NOTE - NS_OBGYNHISTORY_OBGYN_ALL_OB_FT
GYN: Denies  OB:  16, FT, retained placenta, 7#10         2017, FT, uncomplicated 7#10      AP course uncomplicated  Breech presentation

## 2019-09-15 NOTE — OB PROVIDER TRIAGE NOTE - ADDITIONAL INSTRUCTIONS
Follow up at next scheduled prenatal visit  Return for decreased fetal movement, loss of fluid or vaginal bleeding  Kick counts reviewed with patient

## 2019-09-15 NOTE — OB PROVIDER TRIAGE NOTE - PLAN OF CARE
D/C Home  D/W Dr. Dubon  No evidence of acute process or rupture of membranes   Negative amnisure  Follow up at next scheduled prenatal visit  Return for decreased fetal movement, loss of fluid or vaginal bleeding  Kick counts reviewed with patient

## 2019-09-15 NOTE — OB RN TRIAGE NOTE - CHIEF COMPLAINT QUOTE
I felt a gush of fluid at 1700 and my doctor told me to come to the hospital , I also have decreased fetal movement I felt a gush of fluid at 1700 and my doctor told me to come to the hospital , I also have decreased fetal movement, urinary frequency

## 2019-09-15 NOTE — OB PROVIDER TRIAGE NOTE - HISTORY OF PRESENT ILLNESS
28y/o  @33.4wks presents with a gush of fluid while sitting down at 17:00, clear. Reports mild dampness since then. Patient also reports decreased fetal movement since that time but currently feels the baby move.  Denies VB    Allergies: Tylenol- vomiting Latex-rash  Medications: PNV    Denies medical and surgical HX  Denies Psy/Etoh/Smoke/Drugs

## 2019-09-15 NOTE — OB PROVIDER TRIAGE NOTE - NSHPPHYSICALEXAM_GEN_ALL_CORE
Assessment reveals VSS  Abdomen soft, NT, gravid  TAS: breech, brandy 8.31, ant placenta, bpp8/8  SSE: nitrazine negative, pooling negative, ferning negative, whitish d/c noted in the vault  VE: closed    PLAN:  NST  Amnisure Assessment reveals VSS  Abdomen soft, NT, gravid  TAS: breech, brandy 8.31, ant placenta, bpp8/8  SSE: nitrazine negative, pooling negative, ferning negative, whitish d/c noted in the vault  VE: closed    PLAN:  NST  Amnisure    Cat 1 tracing, mild ctx on toco, pain scale 2/10  Amnisure negative

## 2019-09-18 ENCOUNTER — ASOB RESULT (OUTPATIENT)
Age: 29
End: 2019-09-18

## 2019-09-18 ENCOUNTER — APPOINTMENT (OUTPATIENT)
Dept: ANTEPARTUM | Facility: CLINIC | Age: 29
End: 2019-09-18
Payer: COMMERCIAL

## 2019-09-18 PROCEDURE — 76819 FETAL BIOPHYS PROFIL W/O NST: CPT

## 2019-09-18 PROCEDURE — 76816 OB US FOLLOW-UP PER FETUS: CPT

## 2019-11-01 ENCOUNTER — OUTPATIENT (OUTPATIENT)
Dept: INPATIENT UNIT | Facility: HOSPITAL | Age: 29
LOS: 1 days | Discharge: HOME | End: 2019-11-01
Payer: COMMERCIAL

## 2019-11-01 VITALS
RESPIRATION RATE: 16 BRPM | DIASTOLIC BLOOD PRESSURE: 64 MMHG | TEMPERATURE: 98 F | SYSTOLIC BLOOD PRESSURE: 119 MMHG | HEART RATE: 88 BPM

## 2019-11-01 DIAGNOSIS — O26.899 OTHER SPECIFIED PREGNANCY RELATED CONDITIONS, UNSPECIFIED TRIMESTER: ICD-10-CM

## 2019-11-01 DIAGNOSIS — Z3A.00 WEEKS OF GESTATION OF PREGNANCY NOT SPECIFIED: ICD-10-CM

## 2019-11-01 PROCEDURE — 59025 FETAL NON-STRESS TEST: CPT | Mod: 26

## 2019-11-01 PROCEDURE — 76815 OB US LIMITED FETUS(S): CPT | Mod: 26

## 2019-11-01 PROCEDURE — 99213 OFFICE O/P EST LOW 20 MIN: CPT | Mod: 25

## 2019-11-01 NOTE — OB RN TRIAGE NOTE - PMH
Carpal tunnel syndrome  Right hand  Vaginal delivery  nsd- 2016 08/24 girl 7-10 retained placenta no d&c  nsd -2017 12/17 girl 7-10

## 2019-11-02 NOTE — OB PROVIDER TRIAGE NOTE - HISTORY OF PRESENT ILLNESS
29y  at 40w2d presents to triage c/o irregular contractions and passing some mucus   Reports +FM, no vaginal bleeding, no ROM or LOF  AP complications: Denies

## 2019-11-02 NOTE — OB PROVIDER TRIAGE NOTE - ADDITIONAL INSTRUCTIONS
D/c home with instructions  pt discharged - reassuring fetal status  f/u with next scheduled appointment on Monday  daily kick counts reviewed  Labor precautions

## 2019-11-02 NOTE — OB PROVIDER TRIAGE NOTE - NSOBPROVIDERNOTE_OBGYN_ALL_OB_FT
Pt  at 40w3d     Denies current AP complications  NKDA  meds - PNV    abdomen soft and nontender  VSS  TAS - breech, anterior placenta, brandy 12cm, bpp 8/8    d/w    pt discharged - reassuring fetal status  f/u with next scheduled appointment  daily kick counts reviewed  ptl precautions reviewed 29y  at 40w2d presents to triage c/o irregular contractions and passing some mucus   Reports +FM, no vaginal bleeding, no ROM or LOF  AP complications: Denies    GYN: Denies any h/o STDs, fibroids, ovarian Cyst, or abnormal pap smear  OBH:  16 FT  7#10 retained placenta            10/17/17 FT  7#10  PAST MEDICAL: Right hand Carpal Tunnel syndrome  PAST SURGICAL HISTORY: Denies  Carpal tunnel syndrome: Right hand  Vaginal delivery: nsd-  No significant past surgical history    Allergies: latex (Rash)  Tylenol (Vomiting)    MEDICATIONS : PNV    T(C): 36.7 (19 @ 23:05), Max: 36.7 (19 @ 23:05)  HR: 88 (19 @ 23:05) (88 - 88)  BP: 119/64 (19 @ 23:05) (119/64 - 119/64)  RR: 16 (19 @ 23:05) (16 - 16)    Heart: RRR  Lungs: CTA  Abdomen: Gravid, soft, NT    NST: Reactive with moderate variability, Category 1 tracing  [place]: Irregular contractions  VE: 2/-3, intact membranes  TAS: SLIUP, Cephalic, [place]: 9, BPP 8/8    A/P: Pt at 40w3d, no evidence of active labor at this time  D/w Dr Burgos  D/c home with instructions  pt discharged - reassuring fetal status  f/u with next scheduled appointment on Monday  daily kick counts reviewed  Labor precautions

## 2019-11-02 NOTE — OB PROVIDER TRIAGE NOTE - NSHPPHYSICALEXAM_GEN_ALL_CORE
abdomen soft and nontender  VSS  TAS - breech, anterior placenta, brandy 12cm, bpp 8/8 T(C): 36.7 (11-01-19 @ 23:05), Max: 36.7 (11-01-19 @ 23:05)  HR: 88 (11-01-19 @ 23:05) (88 - 88)  BP: 119/64 (11-01-19 @ 23:05) (119/64 - 119/64)  RR: 16 (11-01-19 @ 23:05) (16 - 16)    Heart: RRR  Lungs: CTA  Abdomen: Gravid, soft, NT    NST: Reactive with moderate variability, Category 1 tracing  [employer]: Irregular contractions  VE: 2/30/-3, intact membranes  TAS: SLIUP, Cephalic, [place]: 9, BPP 8/8

## 2019-11-03 ENCOUNTER — INPATIENT (INPATIENT)
Facility: HOSPITAL | Age: 29
LOS: 1 days | Discharge: HOME | End: 2019-11-05
Attending: OBSTETRICS & GYNECOLOGY | Admitting: OBSTETRICS & GYNECOLOGY

## 2019-11-03 ENCOUNTER — TRANSCRIPTION ENCOUNTER (OUTPATIENT)
Age: 29
End: 2019-11-03

## 2019-11-03 VITALS
TEMPERATURE: 99 F | HEART RATE: 107 BPM | RESPIRATION RATE: 16 BRPM | SYSTOLIC BLOOD PRESSURE: 121 MMHG | DIASTOLIC BLOOD PRESSURE: 66 MMHG

## 2019-11-03 DIAGNOSIS — O26.899 OTHER SPECIFIED PREGNANCY RELATED CONDITIONS, UNSPECIFIED TRIMESTER: ICD-10-CM

## 2019-11-03 DIAGNOSIS — Z3A.00 WEEKS OF GESTATION OF PREGNANCY NOT SPECIFIED: ICD-10-CM

## 2019-11-03 PROBLEM — G56.00 CARPAL TUNNEL SYNDROME, UNSPECIFIED UPPER LIMB: Chronic | Status: ACTIVE | Noted: 2019-11-01

## 2019-11-03 LAB
BASOPHILS # BLD AUTO: 0.02 K/UL — SIGNIFICANT CHANGE UP (ref 0–0.2)
BASOPHILS NFR BLD AUTO: 0.2 % — SIGNIFICANT CHANGE UP (ref 0–2)
BLD GP AB SCN SERPL QL: NEGATIVE — SIGNIFICANT CHANGE UP
EOSINOPHIL # BLD AUTO: 0.04 K/UL — SIGNIFICANT CHANGE UP (ref 0–0.5)
EOSINOPHIL NFR BLD AUTO: 0.4 % — SIGNIFICANT CHANGE UP (ref 0–6)
HCT VFR BLD CALC: 35.5 % — SIGNIFICANT CHANGE UP (ref 34.5–45)
HGB BLD-MCNC: 10.2 G/DL — LOW (ref 11.5–15.5)
HIV COMBO RESULT: SIGNIFICANT CHANGE UP
HIV1+2 AB SPEC QL: SIGNIFICANT CHANGE UP
IMM GRANULOCYTES NFR BLD AUTO: 0.5 % — SIGNIFICANT CHANGE UP (ref 0–1.5)
LYMPHOCYTES # BLD AUTO: 1.61 K/UL — SIGNIFICANT CHANGE UP (ref 1–3.3)
LYMPHOCYTES # BLD AUTO: 15.2 % — SIGNIFICANT CHANGE UP (ref 13–44)
MCHC RBC-ENTMCNC: 22.7 PG — LOW (ref 27–34)
MCHC RBC-ENTMCNC: 28.7 % — LOW (ref 32–36)
MCV RBC AUTO: 78.9 FL — LOW (ref 80–100)
MONOCYTES # BLD AUTO: 0.95 K/UL — HIGH (ref 0–0.9)
MONOCYTES NFR BLD AUTO: 9 % — SIGNIFICANT CHANGE UP (ref 2–14)
NEUTROPHILS # BLD AUTO: 7.9 K/UL — HIGH (ref 1.8–7.4)
NEUTROPHILS NFR BLD AUTO: 74.7 % — SIGNIFICANT CHANGE UP (ref 43–77)
NRBC # FLD: 0 K/UL — SIGNIFICANT CHANGE UP (ref 0–0)
PLATELET # BLD AUTO: 205 K/UL — SIGNIFICANT CHANGE UP (ref 150–400)
PMV BLD: 10.8 FL — SIGNIFICANT CHANGE UP (ref 7–13)
RBC # BLD: 4.5 M/UL — SIGNIFICANT CHANGE UP (ref 3.8–5.2)
RBC # FLD: 16.7 % — HIGH (ref 10.3–14.5)
RH IG SCN BLD-IMP: POSITIVE — SIGNIFICANT CHANGE UP
WBC # BLD: 10.57 K/UL — HIGH (ref 3.8–10.5)
WBC # FLD AUTO: 10.57 K/UL — HIGH (ref 3.8–10.5)

## 2019-11-03 RX ORDER — PRAMOXINE HYDROCHLORIDE 150 MG/15G
1 AEROSOL, FOAM RECTAL EVERY 4 HOURS
Refills: 0 | Status: DISCONTINUED | OUTPATIENT
Start: 2019-11-03 | End: 2019-11-05

## 2019-11-03 RX ORDER — INFLUENZA VIRUS VACCINE 15; 15; 15; 15 UG/.5ML; UG/.5ML; UG/.5ML; UG/.5ML
0.5 SUSPENSION INTRAMUSCULAR ONCE
Refills: 0 | Status: DISCONTINUED | OUTPATIENT
Start: 2019-11-03 | End: 2019-11-05

## 2019-11-03 RX ORDER — IBUPROFEN 200 MG
600 TABLET ORAL EVERY 6 HOURS
Refills: 0 | Status: COMPLETED | OUTPATIENT
Start: 2019-11-03 | End: 2020-10-01

## 2019-11-03 RX ORDER — OXYCODONE HYDROCHLORIDE 5 MG/1
5 TABLET ORAL
Refills: 0 | Status: DISCONTINUED | OUTPATIENT
Start: 2019-11-03 | End: 2019-11-05

## 2019-11-03 RX ORDER — LANOLIN
1 OINTMENT (GRAM) TOPICAL EVERY 6 HOURS
Refills: 0 | Status: DISCONTINUED | OUTPATIENT
Start: 2019-11-03 | End: 2019-11-05

## 2019-11-03 RX ORDER — SODIUM CHLORIDE 9 MG/ML
3 INJECTION INTRAMUSCULAR; INTRAVENOUS; SUBCUTANEOUS EVERY 8 HOURS
Refills: 0 | Status: DISCONTINUED | OUTPATIENT
Start: 2019-11-03 | End: 2019-11-05

## 2019-11-03 RX ORDER — OXYTOCIN 10 UNIT/ML
2 VIAL (ML) INJECTION
Qty: 30 | Refills: 0 | Status: DISCONTINUED | OUTPATIENT
Start: 2019-11-03 | End: 2019-11-03

## 2019-11-03 RX ORDER — HYDROCORTISONE 1 %
1 OINTMENT (GRAM) TOPICAL EVERY 6 HOURS
Refills: 0 | Status: DISCONTINUED | OUTPATIENT
Start: 2019-11-03 | End: 2019-11-05

## 2019-11-03 RX ORDER — TETANUS TOXOID, REDUCED DIPHTHERIA TOXOID AND ACELLULAR PERTUSSIS VACCINE, ADSORBED 5; 2.5; 8; 8; 2.5 [IU]/.5ML; [IU]/.5ML; UG/.5ML; UG/.5ML; UG/.5ML
0.5 SUSPENSION INTRAMUSCULAR ONCE
Refills: 0 | Status: DISCONTINUED | OUTPATIENT
Start: 2019-11-03 | End: 2019-11-05

## 2019-11-03 RX ORDER — OXYCODONE HYDROCHLORIDE 5 MG/1
5 TABLET ORAL ONCE
Refills: 0 | Status: DISCONTINUED | OUTPATIENT
Start: 2019-11-03 | End: 2019-11-05

## 2019-11-03 RX ORDER — ACETAMINOPHEN 500 MG
975 TABLET ORAL
Refills: 0 | Status: DISCONTINUED | OUTPATIENT
Start: 2019-11-03 | End: 2019-11-04

## 2019-11-03 RX ORDER — KETOROLAC TROMETHAMINE 30 MG/ML
30 SYRINGE (ML) INJECTION ONCE
Refills: 0 | Status: DISCONTINUED | OUTPATIENT
Start: 2019-11-03 | End: 2019-11-04

## 2019-11-03 RX ORDER — BENZOCAINE 10 %
1 GEL (GRAM) MUCOUS MEMBRANE EVERY 6 HOURS
Refills: 0 | Status: DISCONTINUED | OUTPATIENT
Start: 2019-11-03 | End: 2019-11-05

## 2019-11-03 RX ORDER — OXYTOCIN 10 UNIT/ML
333.33 VIAL (ML) INJECTION
Qty: 20 | Refills: 0 | Status: DISCONTINUED | OUTPATIENT
Start: 2019-11-03 | End: 2019-11-04

## 2019-11-03 RX ORDER — DIPHENHYDRAMINE HCL 50 MG
25 CAPSULE ORAL EVERY 6 HOURS
Refills: 0 | Status: DISCONTINUED | OUTPATIENT
Start: 2019-11-03 | End: 2019-11-05

## 2019-11-03 RX ORDER — AER TRAVELER 0.5 G/1
1 SOLUTION RECTAL; TOPICAL EVERY 4 HOURS
Refills: 0 | Status: DISCONTINUED | OUTPATIENT
Start: 2019-11-03 | End: 2019-11-05

## 2019-11-03 RX ORDER — SIMETHICONE 80 MG/1
80 TABLET, CHEWABLE ORAL EVERY 4 HOURS
Refills: 0 | Status: DISCONTINUED | OUTPATIENT
Start: 2019-11-03 | End: 2019-11-05

## 2019-11-03 RX ORDER — GLYCERIN ADULT
1 SUPPOSITORY, RECTAL RECTAL AT BEDTIME
Refills: 0 | Status: DISCONTINUED | OUTPATIENT
Start: 2019-11-03 | End: 2019-11-05

## 2019-11-03 RX ORDER — DIBUCAINE 1 %
1 OINTMENT (GRAM) RECTAL EVERY 6 HOURS
Refills: 0 | Status: DISCONTINUED | OUTPATIENT
Start: 2019-11-03 | End: 2019-11-05

## 2019-11-03 RX ORDER — OXYTOCIN 10 UNIT/ML
333.33 VIAL (ML) INJECTION
Qty: 20 | Refills: 0 | Status: DISCONTINUED | OUTPATIENT
Start: 2019-11-03 | End: 2019-11-05

## 2019-11-03 RX ORDER — SODIUM CHLORIDE 9 MG/ML
1000 INJECTION, SOLUTION INTRAVENOUS
Refills: 0 | Status: DISCONTINUED | OUTPATIENT
Start: 2019-11-03 | End: 2019-11-03

## 2019-11-03 RX ORDER — MAGNESIUM HYDROXIDE 400 MG/1
30 TABLET, CHEWABLE ORAL
Refills: 0 | Status: DISCONTINUED | OUTPATIENT
Start: 2019-11-03 | End: 2019-11-05

## 2019-11-03 RX ADMIN — SODIUM CHLORIDE 125 MILLILITER(S): 9 INJECTION, SOLUTION INTRAVENOUS at 12:30

## 2019-11-03 RX ADMIN — Medication 2 MILLIUNIT(S)/MIN: at 17:31

## 2019-11-03 NOTE — OB PROVIDER TRIAGE NOTE - NSHPPHYSICALEXAM_GEN_ALL_CORE
30 yo  @ 40.4 wks comes in c/o occasional contractions since thursday with change in discharge to pink since last evening. denies vaginal bleeding, reports +GFM. denies fever chills or dysuria. last seen at OB 10/31 and was 1cm and membranes were stripped. seen by D&T - 2cm and having irregular contractions and sent home. AP course uncomplicated thus far.    GBS: negative from 10/1/19    abd: soft gravid NT  SSE: no blood or pooling noted, negative nitrazine + fern  SVE: 2.5/80/-3  FHT: + accels, negative decels, moderate variability  toco: irregular  Vital Signs Last 24 Hrs  T(C): 37 (2019 11:14), Max: 37 (2019 11:14)  T(F): 98.6 (2019 11:14), Max: 98.6 (2019 11:14)  HR: 96 (2019 11:43) (96 - 107)  BP: 118/69 (2019 11:43) (118/69 - 121/66)  BP(mean): --  RR: 16 (2019 11:14) (16 - 16)  SpO2: --    meds: PNV  ALl: tylenol vomiting, latex    PMH: denies  PSH: denies  gyn hx: denies  Ob hx:  16  @ 41.3 wks induced 7#10 female retained placenta- declined blood transfusion and syncope  17  @ 40wks induced 7#10 female PROM uncomplicated

## 2019-11-03 NOTE — OB PROVIDER DELIVERY SUMMARY - NSPROVIDERDELIVERYNOTE_OBGYN_ALL_OB_FT
Spontaneous vaginal delivery of liveborn infant from TOBIAS position. Head, shoulders and body were delivered easily. Infant was suctioned. No Mec. Delayed cord clamping performed. Cord was clamped and cut and infant was passed to mother. Placenta was delivered intact with 3 vessel cord. Fundal massage was given and uterine fundus was found to be firm. Vaginal exam revealed an intact cervix, vaginal walls and sulci. Patient had a 2nd degree laceration in the perineum that was repaired with 2.0 chromic suture. Excellent hemostasis was noted. Patient stable. Count was correct x2.

## 2019-11-03 NOTE — OB PROVIDER H&P - ASSESSMENT
30 yo  @ 40.4 wks comes in c/o occasional contractions since thursday with change in discharge to pink since last evening. denies vaginal bleeding, reports +GFM. denies fever chills or dysuria. last seen at OB 10/31 and was 1cm and membranes were stripped. seen by D&T - 2cm and having irregular contractions and sent home. AP course uncomplicated thus far.    GBS: negative from 10/1/19    abd: soft gravid NT  SSE: no blood or pooling noted, negative nitrazine + fern  SVE: 2.5/80/-3  FHT: + accels, negative decels, moderate variability  toco: irregular  Vital Signs Last 24 Hrs  T(C): 37 (2019 11:14), Max: 37 (2019 11:14)  T(F): 98.6 (2019 11:14), Max: 98.6 (2019 11:14)  HR: 96 (2019 11:43) (96 - 107)  BP: 118/69 (2019 11:43) (118/69 - 121/66)  BP(mean): --  RR: 16 (2019 11:14) (16 - 16)  SpO2: --    meds: PNV  ALl: tylenol vomiting, latex    PMH: denies  PSH: denies  gyn hx: denies  Ob hx:  16  @ 41.3 wks induced 7#10 female retained placenta- declined blood transfusion and syncope  17  @ 40wks induced 7#10 female PROM uncomplicated      d/w Dr Guerra admit for PROM @ 40.4 wks  for PO Cytotec  for epidural pain management as needed

## 2019-11-03 NOTE — OB PROVIDER H&P - NS_OBGYNHISTORY_OBGYN_ALL_OB_FT
gyn hx: denies  Ob hx:  16  @ 41.3 wks induced 7#10 female retained placenta- declined blood transfusion and syncope  17  @ 40wks induced 7#10 female PROM uncomplicated
quit 20 yrs ago

## 2019-11-03 NOTE — OB PROVIDER H&P - HISTORY OF PRESENT ILLNESS
30 yo  @ 40.4 wks comes in c/o occasional contractions since thursday with change in discharge to pink since last evening. denies vaginal bleeding, reports +GFM. denies fever chills or dysuria. last seen at OB 10/31 and was 1cm and membranes were stripped. seen by D&T - 2cm and having irregular contractions and sent home. AP course uncomplicated thus far.    GBS: negative from 10/1/19    meds: PNV  ALl: tylenol vomiting, latex    PMH: denies  PSH: denies  gyn hx: denies  Ob hx:  16  @ 41.3 wks induced 7#10 female retained placenta- declined blood transfusion and syncope  17  @ 40wks induced 7#10 female PROM uncomplicated

## 2019-11-03 NOTE — CHART NOTE - NSCHARTNOTEFT_GEN_A_CORE
PA Note    patient seen & examined for continuation of management. Feeling contractions, denies pain     VS  T(C): 36.3 (11-03-19 @ 15:15)  HR: 95 (11-03-19 @ 13:50)  BP: 100/61 (11-03-19 @ 13:50)  RR: 16 (11-03-19 @ 15:15)  SpO2: --    130/mod karishma/+accels/no decels  [place] q 3-4min   VE 2.5/70/-3    cont efm/toco  dc PO cytotec  will cont augmentation of labor with pitocin   padmini bowers

## 2019-11-03 NOTE — CHART NOTE - NSCHARTNOTEFT_GEN_A_CORE
R1 OB Labor Note    S: Patient seen and examined at bedside, feeling more pressure    Vital Signs Last 24 Hrs  T(C): 37.0 (03 Nov 2019 18:59), Max: 37 (03 Nov 2019 11:14)  T(F): 98.6 (03 Nov 2019 18:59), Max: 98.6 (03 Nov 2019 11:14)  HR: 101 (03 Nov 2019 18:59) (88 - 118)  BP: 108/64 (03 Nov 2019 18:51) (96/70 - 156/88)  BP(mean): --  RR: 16 (03 Nov 2019 18:59) (16 - 17)  SpO2: --    FHT: 130, moderate, + accels, - decels  [place]: q2  SVE: 5/70/-3    A/P:   - Labor: c/w pitocin  - Fetus: cat 1  - GBS: neg  - Pain: called anesthesia for epidural    Cobb Diamant, PGY-1  d/w Charlie

## 2019-11-03 NOTE — CHART NOTE - NSCHARTNOTEFT_GEN_A_CORE
R1 OB Labor Note    S: Patient seen and examined at bedside for cervical exam    Vital Signs Last 24 Hrs  T(C): 37.0 (03 Nov 2019 18:59), Max: 37 (03 Nov 2019 11:14)  T(F): 98.6 (03 Nov 2019 18:59), Max: 98.6 (03 Nov 2019 11:14)  HR: 100 (03 Nov 2019 20:55) (88 - 128)  BP: 114/61 (03 Nov 2019 20:55) (96/70 - 156/88)  BP(mean): --  RR: 16 (03 Nov 2019 18:59) (16 - 17)  SpO2: 100% (03 Nov 2019 20:54) (100% - 100%)    FHT: 140, moderate, + accels, - decels  Sat[place]ia: q3  SVE: 5/90/-3    A/P:   - Labor: c/w Pitocin  - Fetus: cat 1  - GBS: neg  - Pain: epidural in place    Ferdinand Diamant, PGY-1  d/w Charlie

## 2019-11-03 NOTE — OB PROVIDER IHI INDUCTION/AUGMENTATION NOTE - NS_CHECKALL_OBGYN_ALL_OB
Order was written/Contractions pattern was reviewed/FHR was reviewed/H&P was completed/Induction / Augmentation was discussed
H&P was completed/Induction / Augmentation was discussed/Order was written/Contractions pattern was reviewed/FHR was reviewed

## 2019-11-03 NOTE — OB PROVIDER TRIAGE NOTE - NS_OBGYNHISTORY_OBGYN_ALL_OB_FT
gyn hx: denies  Ob hx:  14  @ 41.3 wks induced 7#10 female retained placenta- declined blood transfusion and syncope  17  @ 40wks induced 7#10 female PROM uncomplicated

## 2019-11-03 NOTE — OB PROVIDER TRIAGE NOTE - HISTORY OF PRESENT ILLNESS
28 yo  @ 40.4 wks comes in c/o occasional contractions since thursday with change in discharge to pink since last evening. denies vaginal bleeding, reports +GFM. denies fever chills or dysuria. last seen at OB 10/31 and was 1cm and membranes were stripped. seen by D&T - 2cm and having irregular contractions and sent home. AP course uncomplicated thus far.    GBS: negative from 10/1/19    meds: PNV  ALl: tylenol vomiting, latex    PMH: denies  PSH: denies  gyn hx: denies  Ob hx:  14  @ 41.3 wks induced 7#10 female retained placenta- declined blood transfusion and syncope  17  @ 40wks induced 7#10 female PROM uncomplicated

## 2019-11-04 LAB — T PALLIDUM AB TITR SER: NEGATIVE — SIGNIFICANT CHANGE UP

## 2019-11-04 RX ORDER — IBUPROFEN 200 MG
1 TABLET ORAL
Qty: 0 | Refills: 0 | DISCHARGE
Start: 2019-11-04

## 2019-11-04 RX ORDER — SENNA PLUS 8.6 MG/1
1 TABLET ORAL
Refills: 0 | Status: DISCONTINUED | OUTPATIENT
Start: 2019-11-04 | End: 2019-11-05

## 2019-11-04 RX ORDER — ASCORBIC ACID 60 MG
500 TABLET,CHEWABLE ORAL DAILY
Refills: 0 | Status: DISCONTINUED | OUTPATIENT
Start: 2019-11-04 | End: 2019-11-05

## 2019-11-04 RX ORDER — DIBUCAINE 1 %
1 OINTMENT (GRAM) RECTAL
Qty: 0 | Refills: 0 | DISCHARGE
Start: 2019-11-04

## 2019-11-04 RX ORDER — IBUPROFEN 200 MG
600 TABLET ORAL EVERY 6 HOURS
Refills: 0 | Status: DISCONTINUED | OUTPATIENT
Start: 2019-11-04 | End: 2019-11-05

## 2019-11-04 RX ORDER — AER TRAVELER 0.5 G/1
1 SOLUTION RECTAL; TOPICAL
Qty: 0 | Refills: 0 | DISCHARGE
Start: 2019-11-04

## 2019-11-04 RX ADMIN — SENNA PLUS 1 TABLET(S): 8.6 TABLET ORAL at 20:03

## 2019-11-04 RX ADMIN — Medication 30 MILLIGRAM(S): at 01:11

## 2019-11-04 RX ADMIN — Medication 1 TABLET(S): at 13:24

## 2019-11-04 RX ADMIN — Medication 600 MILLIGRAM(S): at 21:00

## 2019-11-04 RX ADMIN — Medication 500 MILLIGRAM(S): at 06:20

## 2019-11-04 RX ADMIN — Medication 30 MILLIGRAM(S): at 00:58

## 2019-11-04 RX ADMIN — SODIUM CHLORIDE 3 MILLILITER(S): 9 INJECTION INTRAMUSCULAR; INTRAVENOUS; SUBCUTANEOUS at 05:11

## 2019-11-04 RX ADMIN — Medication 600 MILLIGRAM(S): at 13:24

## 2019-11-04 RX ADMIN — SENNA PLUS 1 TABLET(S): 8.6 TABLET ORAL at 06:19

## 2019-11-04 RX ADMIN — Medication 1000 MILLIUNIT(S)/MIN: at 00:57

## 2019-11-04 RX ADMIN — SIMETHICONE 80 MILLIGRAM(S): 80 TABLET, CHEWABLE ORAL at 06:21

## 2019-11-04 RX ADMIN — Medication 600 MILLIGRAM(S): at 14:20

## 2019-11-04 RX ADMIN — Medication 600 MILLIGRAM(S): at 07:00

## 2019-11-04 RX ADMIN — Medication 600 MILLIGRAM(S): at 20:00

## 2019-11-04 RX ADMIN — Medication 600 MILLIGRAM(S): at 06:18

## 2019-11-04 NOTE — PROVIDER CONTACT NOTE (OTHER) - ASSESSMENT
Pt is tachycardic to the 115s. Pt is afebrile denies any symptoms at this time. Pt is sitting up eating in bed at this time. Fundus is firm and at the umbilicus with moderate bleeding.

## 2019-11-04 NOTE — DISCHARGE NOTE OB - PATIENT PORTAL LINK FT
You can access the FollowMyHealth Patient Portal offered by [facility] by registering at the following website: [email or web address]. By joining [employer]’s FollowMyHealth portal, you will also be able to view your health information using other applications (apps) compatible with our system.

## 2019-11-04 NOTE — DISCHARGE NOTE OB - MEDICATION SUMMARY - MEDICATIONS TO TAKE
I will START or STAY ON the medications listed below when I get home from the hospital:    ibuprofen 600 mg oral tablet  -- 1 tab(s) by mouth every 6 hours  -- Indication: For pain as needed    dibucaine 1% topical ointment  -- 1 application on skin every 6 hours, As needed, Perineal discomfort  -- Indication: For perineal care    witch hazel 50% topical pad  -- 1 application on skin every 4 hours, As needed, Perineal discomfort  -- Indication: For perineal care    Prenatal 1 Plus 1 oral tablet  -- 1 tab(s) by mouth once a day  -- Indication: For post partum

## 2019-11-04 NOTE — OB RN DELIVERY SUMMARY - NS_SEPSISRSKCALC_OBGYN_ALL_OB_FT
EOS calculated successfully. EOS Risk Factor: 0.5/1000 live births ([facility] national incidence); GA=40w4d; Temp=98.6; ROM=21.133; GBS='Negative'; Antibiotics='No antibiotics or any antibiotics < 2 hrs prior to birth'

## 2019-11-04 NOTE — DISCHARGE NOTE OB - MATERIALS PROVIDED
[facility] Hearing Screen Program/Discharge Medication Information for Patients and Families Pocket Guide/Birth Certificate Instructions/Guide to Postpartum Care/[facility] [place] Screening Program/Breastfeeding Log/Back To Sleep Handout/Shaken Baby Prevention Handout/Breastfeeding Guide and Packet/[place]  Immunization Record

## 2019-11-04 NOTE — LACTATION INITIAL EVALUATION - PRO BREASTFEED PREV EXP YN OB
Low breast milk supply, mastitis./yes yes/Low breast milk supply, mastitis.Prevention of breast engorgement, sore nipples, reviewed.  Ways to know infant has had an effective feeding reviewed as well.  Lactation cards given for reinforcement of information given.

## 2019-11-04 NOTE — LACTATION INITIAL EVALUATION - LACTATION INTERVENTIONS
initiate skin to skin/initiate hand expression routine/Instructed and assisted with positioning to facilitate deep latch.  Infant is less than 24 hours of age and reluctant to latch at this time.  Safe skin to skin with frequent attempts encouraged.  Taught hand expression . Discussed outpatient resources available, warm line, breastfeeding support group.  Encouraged to call for assistance, as needed.

## 2019-11-04 NOTE — DISCHARGE NOTE OB - CARE PROVIDER_API CALL
Sil Briceño)  Obstetrics and Gynecology  [address] [place] [address]  Phone: [phone]  Fax: [phone]  Follow Up Time:

## 2019-11-04 NOTE — PROGRESS NOTE ADULT - SUBJECTIVE AND OBJECTIVE BOX
S: Patient doing well. Minimal lochia. Pain controlled. breastfeeding    O: Vital Signs Last 24 Hrs  T(C): 37 (2019 09:00), Max: 37.1 (2019 23:53)  T(F): 98.6 (2019 09:00), Max: 98.8 (2019 23:53)  HR: 92 (2019 02:00) (82 - 128)  BP: 128/58 (2019 02:00) (96/70 - 156/88)  BP(mean): --  RR: 18 (2019 09:00) (16 - 18)  SpO2: 100% (2019 09:00) (85% - 100%)    Gen: NAD  Abd: soft, NT, ND, fundus firm below umbilicus  Lochia: moderate  Ext: no tenderness    Labs:                        10.2   10.57 )-----------( 205      ( 2019 12:30 )             35.5       ABO Interpretation: A ( @ 11:53)    Rh Interpretation: Positive ( @ 11:53)    Antibody Screen Negative      A: 29y PPD#1  s/p  doing well.     Plan: Continue routine postpartum care. Anticipate d/c home in am.

## 2019-11-05 VITALS
SYSTOLIC BLOOD PRESSURE: 104 MMHG | RESPIRATION RATE: 17 BRPM | HEART RATE: 87 BPM | TEMPERATURE: 98 F | OXYGEN SATURATION: 99 % | DIASTOLIC BLOOD PRESSURE: 50 MMHG

## 2019-11-05 RX ADMIN — Medication 600 MILLIGRAM(S): at 01:40

## 2019-11-05 RX ADMIN — Medication 600 MILLIGRAM(S): at 02:40

## 2021-08-30 NOTE — OB PROVIDER TRIAGE NOTE - PLAN OF CARE
Continue with same eyeglasses. D/c home with instructions  pt discharged - reassuring fetal status  f/u with next scheduled appointment on Monday  daily kick counts reviewed  Labor precautions

## 2021-09-17 NOTE — OB RN PATIENT PROFILE - HEALTH/HEALTHCARE ANXIETIES, PROFILE
Pt. Instructed on laxative and skin prep, pre-op meds, clear liquid diet. Ok to take all meds except CoQ10,Vitamin D, Vitamin B12 a.m. of procedure.   
Denies

## 2022-11-08 ENCOUNTER — RESULT REVIEW (OUTPATIENT)
Age: 32
End: 2022-11-08

## 2022-12-12 NOTE — LACTATION INITIAL EVALUATION - LATCH
Continue drinking lots of fluids, rest  Ibuprofen/tylenol as needed for fevers  Flonase once daily to help with congestion  Call if no improvement in 2-3 days Latch not observed.

## 2023-01-07 NOTE — OB PROVIDER TRIAGE NOTE - NSOBPROVIDERNOTE_OBGYN_ALL_OB_FT
[address]  [facility] EMERGENCY DEPT  [address] [phone]    Work/School Note    Date: 1/7/2023    To Whom It May concern:    Nazia Vargas was seen and treated today in the emergency room by the following provider(s):  Attending Provider: Laquita Avalos DO  Nurse Practitioner: Darrell Hernandez NP. Nazia Vargas is excused from work/school on 1/7/2023 through 1/9/2023. She is medically clear to return to work/school on 1/10/2023.          Sincerely,          Fortunato Carlin NP
28 yo  @ 40.4 wks comes in c/o occasional contractions since thursday with change in discharge to pink since last evening. denies vaginal bleeding, reports +GFM. denies fever chills or dysuria. last seen at OB 10/31 and was 1cm and membranes were stripped. seen by D&T - 2cm and having irregular contractions and sent home. AP course uncomplicated thus far.    GBS: negative from 10/1/19    abd: soft gravid NT  SSE: no blood or pooling noted, negative nitrazine + fern  SVE: 2.5/80/-3  FHT: + accels, negative decels, moderate variability  toco: irregular  Vital Signs Last 24 Hrs  T(C): 37 (2019 11:14), Max: 37 (2019 11:14)  T(F): 98.6 (2019 11:14), Max: 98.6 (2019 11:14)  HR: 96 (2019 11:43) (96 - 107)  BP: 118/69 (2019 11:43) (118/69 - 121/66)  BP(mean): --  RR: 16 (2019 11:14) (16 - 16)  SpO2: --    meds: PNV  ALl: tylenol vomiting, latex    PMH: denies  PSH: denies  gyn hx: denies  Ob hx:  14  @ 41.3 wks induced 7#10 female retained placenta- declined blood transfusion and syncope  17  @ 40wks induced 7#10 female PROM uncomplicated      d/w Dr Guerra admit for PROM @ 40.4 wks  for PO Cytotec  for epidural pain management as needed

## 2023-05-30 ENCOUNTER — OUTPATIENT (OUTPATIENT)
Dept: OUTPATIENT SERVICES | Facility: HOSPITAL | Age: 33
LOS: 1 days | End: 2023-05-30
Payer: COMMERCIAL

## 2023-05-30 ENCOUNTER — APPOINTMENT (OUTPATIENT)
Dept: MAMMOGRAPHY | Facility: IMAGING CENTER | Age: 33
End: 2023-05-30
Payer: COMMERCIAL

## 2023-05-30 ENCOUNTER — APPOINTMENT (OUTPATIENT)
Dept: ULTRASOUND IMAGING | Facility: IMAGING CENTER | Age: 33
End: 2023-05-30
Payer: COMMERCIAL

## 2023-05-30 DIAGNOSIS — Z00.8 ENCOUNTER FOR OTHER GENERAL EXAMINATION: ICD-10-CM

## 2023-05-30 PROCEDURE — 77066 DX MAMMO INCL CAD BI: CPT | Mod: 26

## 2023-05-30 PROCEDURE — G0279: CPT | Mod: 26

## 2023-05-30 PROCEDURE — 77066 DX MAMMO INCL CAD BI: CPT

## 2023-05-30 PROCEDURE — 76641 ULTRASOUND BREAST COMPLETE: CPT | Mod: 26,RT

## 2023-05-30 PROCEDURE — G0279: CPT

## 2023-05-30 PROCEDURE — 76641 ULTRASOUND BREAST COMPLETE: CPT

## 2023-11-07 NOTE — OB PROVIDER TRIAGE NOTE - NSOBPROC_OBGYN_ALL_OB
Spoke with patient,  verified. Notified of referral, sent to [facility] per patient request as she did not have any means to write down number. Unknown at This Time

## 2024-02-13 NOTE — OB PROVIDER H&P - ALERT: PERTINENT HISTORY
Pt comes to ED from home with c/o of upper abd pain that started two days ago.  Pt endorses nausea.    Pt reports HX of pancreatitis and was hospitalized for same concern two weeks.    1st Trimester Sonogram/20 Week Level II Sonogram

## 2025-03-10 NOTE — DISCHARGE NOTE OB - PROVIDER TOKENS
Problem: Chronic Conditions and Co-morbidities  Goal: Patient's chronic conditions and co-morbidity symptoms are monitored and maintained or improved  Outcome: Progressing     Problem: Skin/Tissue Integrity  Goal: Skin integrity remains intact  Description: 1.  Monitor for areas of redness and/or skin breakdown  2.  Assess vascular access sites hourly  3.  Every 4-6 hours minimum:  Change oxygen saturation probe site  4.  Every 4-6 hours:  If on nasal continuous positive airway pressure, respiratory therapy assess nares and determine need for appliance change or resting period  Outcome: Progressing     Problem: Safety - Adult  Goal: Free from fall injury  Outcome: Progressing     Problem: Pain  Goal: Verbalizes/displays adequate comfort level or baseline comfort level  Outcome: Progressing      TOKEN:'[id number]'

## 2025-04-16 ENCOUNTER — APPOINTMENT (OUTPATIENT)
Age: 35
End: 2025-04-16
Payer: COMMERCIAL

## 2025-04-16 DIAGNOSIS — B35.1 TINEA UNGUIUM: ICD-10-CM

## 2025-04-16 DIAGNOSIS — M79.675 TINEA UNGUIUM: ICD-10-CM

## 2025-04-16 DIAGNOSIS — M79.674 TINEA UNGUIUM: ICD-10-CM

## 2025-04-16 DIAGNOSIS — L60.2 ONYCHOGRYPHOSIS: ICD-10-CM

## 2025-04-16 DIAGNOSIS — L60.0 INGROWING NAIL: ICD-10-CM

## 2025-04-16 PROCEDURE — 99213 OFFICE O/P EST LOW 20 MIN: CPT | Mod: 25

## 2025-04-16 PROCEDURE — 11765 WEDGE EXCISION SKN NAIL FOLD: CPT

## 2025-04-16 PROCEDURE — 11721 DEBRIDE NAIL 6 OR MORE: CPT
